# Patient Record
Sex: FEMALE | Race: WHITE | NOT HISPANIC OR LATINO | URBAN - METROPOLITAN AREA
[De-identification: names, ages, dates, MRNs, and addresses within clinical notes are randomized per-mention and may not be internally consistent; named-entity substitution may affect disease eponyms.]

---

## 2018-03-02 ENCOUNTER — OFFICE VISIT (OUTPATIENT)
Dept: URGENT CARE | Facility: CLINIC | Age: 43
End: 2018-03-02
Payer: COMMERCIAL

## 2018-03-02 VITALS
RESPIRATION RATE: 16 BRPM | BODY MASS INDEX: 20.92 KG/M2 | OXYGEN SATURATION: 99 % | SYSTOLIC BLOOD PRESSURE: 125 MMHG | DIASTOLIC BLOOD PRESSURE: 77 MMHG | HEIGHT: 68 IN | HEART RATE: 59 BPM | TEMPERATURE: 98 F | WEIGHT: 138 LBS

## 2018-03-02 DIAGNOSIS — J02.9 SORE THROAT: Primary | ICD-10-CM

## 2018-03-02 DIAGNOSIS — J06.9 UPPER RESPIRATORY TRACT INFECTION, UNSPECIFIED TYPE: ICD-10-CM

## 2018-03-02 LAB
CTP QC/QA: YES
S PYO RRNA THROAT QL PROBE: NEGATIVE

## 2018-03-02 PROCEDURE — 99203 OFFICE O/P NEW LOW 30 MIN: CPT | Mod: 25,S$GLB,, | Performed by: FAMILY MEDICINE

## 2018-03-02 PROCEDURE — 87070 CULTURE OTHR SPECIMN AEROBIC: CPT

## 2018-03-02 PROCEDURE — 87880 STREP A ASSAY W/OPTIC: CPT | Mod: QW,S$GLB,, | Performed by: FAMILY MEDICINE

## 2018-03-02 PROCEDURE — 96372 THER/PROPH/DIAG INJ SC/IM: CPT | Mod: S$GLB,,, | Performed by: FAMILY MEDICINE

## 2018-03-02 RX ORDER — BETAMETHASONE SODIUM PHOSPHATE AND BETAMETHASONE ACETATE 3; 3 MG/ML; MG/ML
6 INJECTION, SUSPENSION INTRA-ARTICULAR; INTRALESIONAL; INTRAMUSCULAR; SOFT TISSUE
Status: COMPLETED | OUTPATIENT
Start: 2018-03-02 | End: 2018-03-02

## 2018-03-02 RX ORDER — CALCIUM ACETATE 667 MG/1
667 CAPSULE ORAL
COMMUNITY

## 2018-03-02 RX ORDER — AMOXICILLIN 875 MG/1
875 TABLET, FILM COATED ORAL 2 TIMES DAILY
Qty: 20 TABLET | Refills: 0 | Status: SHIPPED | OUTPATIENT
Start: 2018-03-02 | End: 2018-03-12

## 2018-03-02 RX ORDER — MULTIVITAMIN
1 TABLET ORAL DAILY
COMMUNITY

## 2018-03-02 RX ADMIN — BETAMETHASONE SODIUM PHOSPHATE AND BETAMETHASONE ACETATE 6 MG: 3; 3 INJECTION, SUSPENSION INTRA-ARTICULAR; INTRALESIONAL; INTRAMUSCULAR; SOFT TISSUE at 09:03

## 2018-03-02 NOTE — PATIENT INSTRUCTIONS
Pharyngitis   If your condition worsens or fails to improve we recommend that you receive another evaluation at the ER immediately or contact your PCP to discuss your concerns or return here. You must understand that you've received an urgent care treatment only and that you may be released before all your medical problems are known or treated. You the patient will arrange for followup care as instructed.   The majority of all sore throats or tonsillitis are viral and antibiotics will not treat this.     If the strep test performed in office was Positive:  - Complete the full course of antibiotics given  - Drink plenty of cool liquids while avoid any beverage or food that can irritate your            throat (acidic, spicy or salty foods).  - Throw away your toothbrush now and when you complete your antibiotics.    If the strep culture was done and returns negative in 3-5 days and you are still having a sore throat, you may need to get a mono spot test done or repeated.   Tylenol or ibuprofen for pain may help as long as you are not allergic to these meds or have a medical condition such as stomach ulcers, liver or kidney disease or taking blood thinners etc that would   prevent you from using these medications.   Rest and fluids will help as well.   If you were prescribed antibiotics and are female and on BCP use additional methods to prevent pregnancy while on the antibiotics and for one cycle after.                                                                URI   If your condition worsens or fails to improve we recommend that you receive another evaluation at the ER immediately or contact your PCP to discuss your concerns or return here. You must understand that you've received an urgent care treatment only and that you may be released before all your medical problems are known or treated. You the patient will arrange for follouwp care as instructed.   If  "we discussed that I think your illness is viral, it will not respond to antibiotics and will last 5-7 days. If we discussed "wait and see" antibiotics and if over the next few days the symptoms worsen start the antibiotics I have given you.   -  If you are female and on BCP and do take the antibiotics, use additional methods to prevent pregnancy while on the antibiotics and for one cycle after.   -  Flonase (fluticasone) is a nasal spray which is available over the counter and may help with your symptoms.   -  Zyrtec D, Claritin D or Allegra D can also help with symptoms of congestion and drainage.   -  If you have hypertension avoid using the "D" which is the decongestant.  Instead you can use Coricidin HBP for cold and cough symptoms.    -  If you just have drainage you can take plain Zyrtec, Claritin or Allegra   -  If you just have a congested feeling you can take pseudoephedrine (unless you have high blood pressure) which you have to sign for behind the counter. Do not buy the phenylephrine which is on the shelf as it is not effective   -  Rest and fluids are also important.   -  Tylenol or ibuprofen can also be used as directed for pain unless you have an allergy to them or medical condition such as stomach ulcers, kidney or liver disease or blood thinners etc for which you should not be taking these type of medications.   -  If you are flying in the next few days Afrin nose drops for the airplane flight upon take off and landing may help. Other than at those times refrain from using afrin.   - If you were prescribed a narcotic do not drive or operate heavy machinery while taking these medications.     You have received a WASP (Wait and See Prescription) of antibiotics.  Usually this is in case your symptoms worsen (worsening fever, sinus pain, sore throat or ear pain).  The overall goal is to give our symptomatic treatment at least 48 hrs to improve your symptoms.  If they are not improving, I would like you " to call us and we can decide to start the antibiotics.   We are working extremely hard to limit antibiotic use to prevent resistance.      Steroid Injection  You received a steroid shot today - As discussed, this can elevate your blood pressure, elevate your blood sugar, water weight gain, nervous energy, redness to the face and dimpling of the skin where the shot goes in.         Pharyngitis (Sore Throat), Report Pending    Pharyngitis (sore throat) is often due to a virus. It can also be caused by the streptococcus, or strep, bacterium, often called strep throat. Both viral and strep infections can cause throat pain that is worse when swallowing, aching all over with headache, and fever. Both types of infections are contagious. They may be spread by coughing, kissing, or touching others after touching your mouth or nose.  A test has been done to find out whether you (or your child, if your child is the patient) have strep throat. Call this facility or your healthcare provider if you were not given your test results. If the test is positive for strep infection, you will need to take antibiotic medicines. A prescription can be called into your pharmacy at that time. If the test is negative, you probably have a viral pharyngitis. This does not need to be treated with antibiotics. Until you receive the results of the strep test, you should stay home from work. If your child is being tested, he or she should stay home from school.  Home care  · Rest at home. Drink plenty of fluids so you won't get dehydrated.  · If the test is positive for strep, don't go to work or school for the first 2 days of taking the antibiotics. After this time, you will not be contagious. You can then return to work or school if you are feeling better.   · Take the antibiotic medicine for the full 10 days, even if you feel better. This is very important to make sure the infection is treated. It is also important to prevent drug-resistant germs  from developing. If you were given an antibiotic shot, you won't need more antibiotics.  · For children: Use acetaminophen for fever, fussiness, or discomfort. In infants older than 6 months of age, you may use ibuprofen instead of acetaminophen. Talk with your child's healthcare provider before giving these medicines if your child has chronic liver or kidney disease or ever had a stomach ulcer or GI bleeding. Never give aspirin to a child under 18 years of age who is ill with a fever. It may cause severe liver damage.  · For adults: Use acetaminophen or ibuprofen to control pain or fever, unless another medicine was prescribed for this. Talk with your healthcare provider before taking these medicines if you have chronic liver or kidney disease or ever had a stomach ulcer or GI bleeding.  · Use throat lozenges or numbing throat sprays to help reduce pain. Gargling with warm salt water will also help reduce throat pain. For this, dissolve 1/2 teaspoon of salt in 1 glass of warm water. To help soothe a sore throat, children can sip on juice or a popsicle. Children 5 years and older can also suck on a lollipop or hard candy.  · Don't eat salty or spicy foods. These can irritate the throat.  Follow-up care  Follow up with your healthcare provider or our staff if you don't get better over the next week.  When to seek medical advice  Call your healthcare provider right away if any of these occur:  · Fever as directed by your healthcare provider. For children, seek care if:  ¨ Your child is of any age and has repeated fevers above 104°F (40°C).  ¨ Your child is younger than 2 years of age and has a fever of 100.4°F (38°C) that continues for more than 1 day.  ¨ Your child is 2 years old or older and has a fever of 100.4°F (38°C) that continues for more than 3 days.  · New or worsening ear pain, sinus pain, or headache  · Painful lumps in the back of neck  · Stiff neck  · Lymph nodes are getting larger  · Inability to  swallow liquids, excessive drooling, or inability to open mouth wide due to throat pain  · Signs of dehydration (very dark urine or no urine, sunken eyes, dizziness)  · Trouble breathing or noisy breathing  · Muffled voice  · New rash  · Child appears to be getting sicker  Date Last Reviewed: 4/13/2015  © 1993-3875 [employer]. [address] [place] [address]. All rights reserved. This information is not intended as a substitute for professional medical care. Always follow your healthcare professional's instructions.        Viral Upper Respiratory Illness (Adult)  You have a viral upper respiratory illness (URI), which is another term for the common cold. This illness is contagious during the first few days. It is spread through the air by coughing and sneezing. It may also be spread by direct contact (touching the sick person and then touching your own eyes, nose, or mouth). Frequent handwashing will decrease risk of spread. Most viral illnesses go away within 7 to 10 days with rest and simple home remedies. Sometimes the illness may last for several weeks. Antibiotics will not kill a virus, and they are generally not prescribed for this condition.    Home care  · If symptoms are severe, rest at home for the first 2 to 3 days. When you resume activity, don't let yourself get too tired.  · Avoid being exposed to cigarette smoke (yours or others).  · You may use acetaminophen or ibuprofen to control pain and fever, unless another medicine was prescribed. (Note: If you have chronic liver or kidney disease, have ever had a stomach ulcer or gastrointestinal bleeding, or are taking blood-thinning medicines, talk with your healthcare provider before using these medicines.) Aspirin should never be given to anyone under 18 years of age who is ill with a viral infection or fever. It may cause severe liver or brain damage.  · Your appetite may be poor, so a light diet is fine. Avoid dehydration by  drinking 6 to 8 glasses of fluids per day (water, soft drinks, juices, tea, or soup). Extra fluids will help loosen secretions in the nose and lungs.  · Over-the-counter cold medicines will not shorten the length of time youre sick, but they may be helpful for the following symptoms: cough, sore throat, and nasal and sinus congestion. (Note: Do not use decongestants if you have high blood pressure.)  Follow-up care  Follow up with your healthcare provider, or as advised.  When to seek medical advice  Call your healthcare provider right away if any of these occur:  · Cough with lots of colored sputum (mucus)  · Severe headache; face, neck, or ear pain  · Difficulty swallowing due to throat pain  · Fever of 100.4°F (38°C)  Call 911, or get immediate medical care  Call emergency services right away if any of these occur:  · Chest pain, shortness of breath, wheezing, or difficulty breathing  · Coughing up blood  · Inability to swallow due to throat pain  Date Last Reviewed: 9/13/2015  © 4554-1794 [employer]. [address] [place] [address], [place] [address]. All rights reserved. This information is not intended as a substitute for professional medical care. Always follow your healthcare professional's instructions.         118

## 2018-03-02 NOTE — PROGRESS NOTES
"Subjective:       Patient ID: Tram Rodrigues is a 43 y.o. female.    Vitals:  height is 5' 8" (1.727 m) and weight is 62.6 kg (138 lb). Her temperature is 97.7 °F (36.5 °C). Her blood pressure is 125/77 and her pulse is 59 (abnormal). Her respiration is 16 and oxygen saturation is 99%.     Chief Complaint: Sore Throat    Pt in for sore throat. Pt just getting over her 3 kids being sick. Her kids had strep. Pt having some sinus pressure. Symptoms first started Wednesday. Pt has been taking motrin for symptoms. Has hx of strep. Pt here to run a marathon on Sunday. From [place]. Pt last dose of motrin was around 7-8am this morning. Pt having some post nasal drip. Pt has hx of sinus infections.       Sore Throat    This is a new problem. The current episode started in the past 7 days. The problem has been gradually worsening. The pain is at a severity of 4/10. Associated symptoms include headaches, swollen glands and trouble swallowing. Pertinent negatives include no abdominal pain, congestion, coughing, diarrhea, ear pain, hoarse voice, shortness of breath or vomiting. She has had exposure to strep.     Review of Systems   Constitution: Positive for weakness and malaise/fatigue. Negative for chills, decreased appetite and fever.   HENT: Positive for sore throat and trouble swallowing. Negative for congestion, ear pain and hoarse voice.    Eyes: Negative for discharge and redness.   Cardiovascular: Negative for chest pain, dyspnea on exertion and leg swelling.   Respiratory: Negative for cough, shortness of breath, sputum production and wheezing.    Musculoskeletal: Negative for myalgias.   Gastrointestinal: Negative for abdominal pain, constipation, diarrhea, nausea and vomiting.   Neurological: Positive for dizziness and headaches.       Objective:      Physical Exam   Constitutional: She is oriented to person, place, and time. Vital signs are normal. She appears well-developed and well-nourished. She is " cooperative.  Non-toxic appearance. She does not appear ill. No distress.   HENT:   Head: Normocephalic and atraumatic.   Right Ear: Hearing, tympanic membrane, external ear and ear canal normal.   Left Ear: Hearing, tympanic membrane, external ear and ear canal normal.   Nose: Rhinorrhea present. No mucosal edema or nasal deformity. No epistaxis. Right sinus exhibits no maxillary sinus tenderness and no frontal sinus tenderness. Left sinus exhibits no maxillary sinus tenderness and no frontal sinus tenderness.   Mouth/Throat: Uvula is midline and mucous membranes are normal. No trismus in the jaw. Normal dentition. No uvula swelling. Posterior oropharyngeal erythema (post nasal drainage noted) present. Tonsils are 0 on the right. Tonsils are 0 on the left.   Eyes: Conjunctivae, EOM and lids are normal. Pupils are equal, round, and reactive to light. No scleral icterus.   Sclera clear bilat   Neck: Trachea normal, normal range of motion, full passive range of motion without pain and phonation normal. Neck supple.   Cardiovascular: Normal rate, regular rhythm, normal heart sounds, intact distal pulses and normal pulses.    Pulmonary/Chest: Effort normal and breath sounds normal. No respiratory distress.   Abdominal: Soft. Normal appearance and bowel sounds are normal. She exhibits no distension. There is no tenderness.   Musculoskeletal: Normal range of motion. She exhibits no edema or deformity.   Neurological: She is alert and oriented to person, place, and time. She exhibits normal muscle tone. Coordination normal.   Skin: Skin is warm, dry and intact. She is not diaphoretic. No pallor.   Psychiatric: She has a normal mood and affect. Her speech is normal and behavior is normal. Judgment and thought content normal. Cognition and memory are normal.   Nursing note and vitals reviewed.      Office Visit on 03/02/2018   Component Date Value Ref Range Status    Rapid Strep A Screen 03/02/2018 Negative  Negative Final      Acceptable 03/02/2018 Yes   Final     Assessment:       1. Sore throat    2. Upper respiratory tract infection, unspecified type        Plan:         Sore throat  -     POCT rapid strep A  -     Throat culture    Upper respiratory tract infection, unspecified type  -     betamethasone acetate-betamethasone sodium phosphate injection 6 mg; Inject 1 mL (6 mg total) into the muscle one time.  -     amoxicillin (AMOXIL) 875 MG tablet; Take 1 tablet (875 mg total) by mouth 2 (two) times daily.  Dispense: 20 tablet; Refill: 0      Patient Instructions                                                         Pharyngitis   If your condition worsens or fails to improve we recommend that you receive another evaluation at the ER immediately or contact your PCP to discuss your concerns or return here. You must understand that you've received an urgent care treatment only and that you may be released before all your medical problems are known or treated. You the patient will arrange for followup care as instructed.   The majority of all sore throats or tonsillitis are viral and antibiotics will not treat this.     If the strep test performed in office was Positive:  - Complete the full course of antibiotics given  - Drink plenty of cool liquids while avoid any beverage or food that can irritate your            throat (acidic, spicy or salty foods).  - Throw away your toothbrush now and when you complete your antibiotics.    If the strep culture was done and returns negative in 3-5 days and you are still having a sore throat, you may need to get a mono spot test done or repeated.   Tylenol or ibuprofen for pain may help as long as you are not allergic to these meds or have a medical condition such as stomach ulcers, liver or kidney disease or taking blood thinners etc that would   prevent you from using these medications.   Rest and fluids will help as well.   If you were prescribed antibiotics and are female  "and on BCP use additional methods to prevent pregnancy while on the antibiotics and for one cycle after.                                                                URI   If your condition worsens or fails to improve we recommend that you receive another evaluation at the ER immediately or contact your PCP to discuss your concerns or return here. You must understand that you've received an urgent care treatment only and that you may be released before all your medical problems are known or treated. You the patient will arrange for follouw care as instructed.   If we discussed that I think your illness is viral, it will not respond to antibiotics and will last 5-7 days. If we discussed "wait and see" antibiotics and if over the next few days the symptoms worsen start the antibiotics I have given you.   -  If you are female and on BCP and do take the antibiotics, use additional methods to prevent pregnancy while on the antibiotics and for one cycle after.   -  Flonase (fluticasone) is a nasal spray which is available over the counter and may help with your symptoms.   -  Zyrtec D, Claritin D or Allegra D can also help with symptoms of congestion and drainage.   -  If you have hypertension avoid using the "D" which is the decongestant.  Instead you can use Coricidin HBP for cold and cough symptoms.    -  If you just have drainage you can take plain Zyrtec, Claritin or Allegra   -  If you just have a congested feeling you can take pseudoephedrine (unless you have high blood pressure) which you have to sign for behind the counter. Do not buy the phenylephrine which is on the shelf as it is not effective   -  Rest and fluids are also important.   -  Tylenol or ibuprofen can also be used as directed for pain unless you have an allergy to them or medical condition such as stomach ulcers, kidney or liver disease or blood thinners etc for which you should not be taking these type of medications.   -  If you are flying in " the next few days Afrin nose drops for the airplane flight upon take off and landing may help. Other than at those times refrain from using afrin.   - If you were prescribed a narcotic do not drive or operate heavy machinery while taking these medications.     You have received a WASP (Wait and See Prescription) of antibiotics.  Usually this is in case your symptoms worsen (worsening fever, sinus pain, sore throat or ear pain).  The overall goal is to give our symptomatic treatment at least 48 hrs to improve your symptoms.  If they are not improving, I would like you to call us and we can decide to start the antibiotics.   We are working extremely hard to limit antibiotic use to prevent resistance.      Steroid Injection  You received a steroid shot today - As discussed, this can elevate your blood pressure, elevate your blood sugar, water weight gain, nervous energy, redness to the face and dimpling of the skin where the shot goes in.

## 2018-03-05 ENCOUNTER — TELEPHONE (OUTPATIENT)
Dept: URGENT CARE | Facility: CLINIC | Age: 43
End: 2018-03-05

## 2018-03-05 LAB — BACTERIA THROAT CULT: NORMAL

## 2018-03-05 NOTE — TELEPHONE ENCOUNTER
----- Message from Naomi Gore NP sent at 3/5/2018  4:06 PM CST -----  Please call patient back to inform of normal results.  Thank you.

## 2022-03-28 ENCOUNTER — OFFICE VISIT (OUTPATIENT)
Dept: ORTHOPEDIC SURGERY | Age: 47
End: 2022-03-28
Payer: COMMERCIAL

## 2022-03-28 VITALS — WEIGHT: 140 LBS | BODY MASS INDEX: 21.22 KG/M2 | HEIGHT: 68 IN

## 2022-03-28 DIAGNOSIS — M25.551 RIGHT HIP PAIN: Primary | ICD-10-CM

## 2022-03-28 PROCEDURE — 99204 OFFICE O/P NEW MOD 45 MIN: CPT | Performed by: ORTHOPAEDIC SURGERY

## 2022-03-28 RX ORDER — GABAPENTIN 600 MG/1
600 TABLET ORAL
COMMUNITY
Start: 2022-01-16

## 2022-03-28 RX ORDER — DICLOFENAC SODIUM 50 MG/1
50 TABLET, DELAYED RELEASE ORAL 2 TIMES DAILY WITH MEALS
Qty: 60 TABLET | Refills: 0 | Status: SHIPPED | OUTPATIENT
Start: 2022-03-28 | End: 2022-09-28 | Stop reason: ALTCHOICE

## 2022-03-28 RX ORDER — BISMUTH SUBSALICYLATE 262 MG
1 TABLET,CHEWABLE ORAL DAILY
COMMUNITY

## 2022-03-28 NOTE — PROGRESS NOTES
Isadore Homans (: 1975) is a 49 y.o. female patient, here for evaluation of the following chief complaint(s):  Knee Pain (right hip pain)       ASSESSMENT/PLAN:  Below is the assessment and plan developed based on review of pertinent history, physical exam, labs, studies, and medications. 50-year-old female comes in today complaining of right-sided hip pain. This is going on for 3 weeks. 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 Pain is in her groin and thigh. Exam is consistent for possible labral tearing. Will monitor with conservative management. Admitting her to physical therapy. Also gave her diclofenac. Risks and benefits of diclofenac were discussed with her. If she is not better in the next 4 to 6 weeks will consider getting MRI. 1. Right hip pain  -     XR HIP RT W OR WO PELV 2-3 VWS; Future  -     REFERRAL TO PHYSICAL THERAPY      Encounter Diagnosis   Name Primary?  Right hip pain Yes        No follow-ups on file. SUBJECTIVE/OBJECTIVE:  Isadore Homans (: 1975) is a 49 y.o. female who presents today for the following:  Chief Complaint   Patient presents with    Knee Pain     right hip pain       50-year-old female comes in today complaining of right-sided hip pain. This is going on for 3 weeks. 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 Pain is in her groin and thigh. She notices a mild limp occasionally. She does not use a walking aid. She has tried Motrin but this has not helped. IMAGING:  XR Results (most recent):  Results from Appointment encounter on 22    XR HIP RT W OR WO PELV 2-3 VWS    Narrative  AP lateral x-rays ordered and inability view the right hip. Joint spaces well-preserved. No significant dysplasia or impingement. No fractures       No Known Allergies    Current Outpatient Medications   Medication Sig    gabapentin (NEURONTIN) 600 mg tablet Take 600 mg by mouth nightly.     multivitamin (ONE A DAY) tablet Take 1 Tablet by mouth daily.  Lactobac no.41/Bifidobact no.7 (PROBIOTIC-10 PO) Take  by mouth.  diclofenac EC (VOLTAREN) 50 mg EC tablet Take 1 Tablet by mouth two (2) times daily (with meals). No current facility-administered medications for this visit. History reviewed. No pertinent past medical history. Past Surgical History:   Procedure Laterality Date    HX TUBAL LIGATION      ME VAG HYST,RMV TUBE/OVARY         Family History   Problem Relation Age of Onset    Heart Disease Father         Social History     Tobacco Use    Smoking status: Never Smoker    Smokeless tobacco: Never Used   Substance Use Topics    Alcohol use: Yes        All systems reviewed x 12 and were negative with the exception of None      No flowsheet data found. Vitals:  Ht 5' 8\" (1.727 m)   Wt 140 lb (63.5 kg)   BMI 21.29 kg/m²    Body mass index is 21.29 kg/m². Physical Exam    General: NAD, well developed, well nourished. Cardiac: Extremities well perfused. Respiratory: Nonlabored breathing. RLE: No antalgic gait. Discomfort with stinchfield. 0-100 degrees of flexion. >20 degrees internal rotation, >30 degrees external rotation. Negative KAYLAN. Moderate pain with flexion adduction and internal rotation. .  Motor strength grossly intact. LLE: No antalgic gait. Negative stinchfield. 0-100 degrees of flexion. >20 degrees internal rotation, >30 degrees external rotation. Negative KAYLAN. No pain with flexion adduction and internal rotation. .  Motor strength grossly intact. Skin: Warm well perfused. Vascular: Palpable pedal pulses bilaterally. Equal. Capillary refill less than 2 seconds. An electronic signature was used to authenticate this note.   -- Jane Lazo MD

## 2022-05-19 ENCOUNTER — OFFICE VISIT (OUTPATIENT)
Dept: ORTHOPEDIC SURGERY | Age: 47
End: 2022-05-19
Payer: COMMERCIAL

## 2022-05-19 VITALS — HEIGHT: 68 IN | BODY MASS INDEX: 21.22 KG/M2 | WEIGHT: 140 LBS

## 2022-05-19 DIAGNOSIS — M25.551 RIGHT HIP PAIN: Primary | ICD-10-CM

## 2022-05-19 DIAGNOSIS — M70.61 TROCHANTERIC BURSITIS OF RIGHT HIP: ICD-10-CM

## 2022-05-19 PROCEDURE — 20605 DRAIN/INJ JOINT/BURSA W/O US: CPT | Performed by: PHYSICIAN ASSISTANT

## 2022-05-19 PROCEDURE — 99213 OFFICE O/P EST LOW 20 MIN: CPT | Performed by: PHYSICIAN ASSISTANT

## 2022-05-19 RX ORDER — TRIAMCINOLONE ACETONIDE 40 MG/ML
40 INJECTION, SUSPENSION INTRA-ARTICULAR; INTRAMUSCULAR ONCE
Status: DISCONTINUED | OUTPATIENT
Start: 2022-05-19 | End: 2022-05-20

## 2022-05-20 RX ORDER — TRIAMCINOLONE ACETONIDE 40 MG/ML
40 INJECTION, SUSPENSION INTRA-ARTICULAR; INTRAMUSCULAR ONCE
Status: SHIPPED | OUTPATIENT
Start: 2022-05-20 | End: 2022-05-20

## 2022-05-20 NOTE — PROGRESS NOTES
Yesenia Webber (: 1975) is a 49 y.o. female patient, here for evaluation of the following chief complaint(s):  Hip Pain (right hip pain)       ASSESSMENT/PLAN:  Below is the assessment and plan developed based on review of pertinent history, physical exam, labs, studies, and medications. 53-year-old female comes in today complaining of right-sided hip pain. Has been going on for 10+ weeks. Did a course of physical therapy as well as prescription medication with no relief in symptoms. Pain is still present. In her anterior hip. Unable to do any sort of cardio physical activity. She also has some point tenderness over the lateral hip today. Discussed with patient that she could have issues going on with her labrum, will plan to move forward with an MRI given failure of conservative treatment and duration of symptoms. We will also plan to do a steroid injection of the lateral hip she has a mixed picture with trochanteric bursitis. After verbal consent was obtained I injected 40 mg triamcinolone into the right trochanteric bursa using sterile technique. Patient tolerated well. Follow-up once MRI completed or sooner as needed. 1. Right hip pain  -     DRAIN/INJECT INTERMEDIATE JOINT/BURSA  -     MRI HIP  RT  WO CONT; Future  2. Trochanteric bursitis of right hip  -     triamcinolone acetonide (KENALOG-40) 40 mg/mL injection 40 mg; 40 mg, Intra artICUlar, ONCE, 1 dose, On 22 at 1200      Encounter Diagnoses   Name Primary?  Right hip pain Yes    Trochanteric bursitis of right hip         No follow-ups on file. SUBJECTIVE/OBJECTIVE:  Yesenia Webber (: 1975) is a 49 y.o. female who presents today for the following:  Chief Complaint   Patient presents with    Hip Pain     right hip pain       53-year-old female comes in today for follow-up. She has been having right-sided hip pain in her anterior groin for 10+ weeks.   She is a runner and states she currently cannot run. She has tried conservative management with physical therapy, prescription medication, activity modification, rest and ice with no improvement in symptoms. Concern for possible labral tearing. She also has some point tenderness over her lateral hip today. No radiation of symptoms. IMAGING:  XR Results (most recent):  Results from Appointment encounter on 03/28/22    XR HIP RT W OR WO PELV 2-3 VWS    Narrative  AP lateral x-rays ordered and inability view the right hip. Joint spaces well-preserved. No significant dysplasia or impingement. No fractures       No Known Allergies    Current Outpatient Medications   Medication Sig    gabapentin (NEURONTIN) 600 mg tablet Take 600 mg by mouth nightly.  multivitamin (ONE A DAY) tablet Take 1 Tablet by mouth daily.  Lactobac no.41/Bifidobact no.7 (PROBIOTIC-10 PO) Take  by mouth.  diclofenac EC (VOLTAREN) 50 mg EC tablet Take 1 Tablet by mouth two (2) times daily (with meals). Current Facility-Administered Medications   Medication    triamcinolone acetonide (KENALOG-40) 40 mg/mL injection 40 mg       No past medical history on file. Past Surgical History:   Procedure Laterality Date    HX TUBAL LIGATION      [place] VAG HYST,RMV TUBE/OVARY         Family History   Problem Relation Age of Onset    Heart Disease Father         Social History     Tobacco Use    Smoking status: Never Smoker    Smokeless tobacco: Never Used   Substance Use Topics    Alcohol use: Yes        All systems reviewed x 12 and were negative with the exception of None      No flowsheet data found. Vitals:  Ht 5' 8\" (1.727 m)   Wt 140 lb (63.5 kg)   BMI 21.29 kg/m²    Body mass index is 21.29 kg/m². Physical Exam    General: NAD, well developed, well nourished.     Cardiac: Extremities well perfused.     Respiratory: Nonlabored breathing.      RLE: Mild antalgic gait. Discomfort with stinchfield. 0-100 degrees of flexion.  >20 degrees internal rotation, >30 degrees external rotation. Negative KAYLAN. Moderate pain with flexion adduction and internal rotation. Mild point tenderness over lateral hip. Motor strength grossly intact.     LLE: No antalgic gait. Negative stinchfield. 0-100 degrees of flexion. >20 degrees internal rotation, >30 degrees external rotation. Negative KAYLAN. No pain with flexion adduction and internal rotation. .  Motor strength grossly intact.     Skin: Warm well perfused.       Vascular: Palpable pedal pulses bilaterally. Equal. Capillary refill less than 2 seconds    Randee Hancock M.D. was available for immediate consultation as the supervising physician. An electronic signature was used to authenticate this note.   -- Stephany Salamanca PA-C

## 2022-05-25 ENCOUNTER — HOSPITAL ENCOUNTER (OUTPATIENT)
Dept: MRI IMAGING | Age: 47
Discharge: HOME | End: 2022-05-25
Attending: PHYSICIAN ASSISTANT
Payer: COMMERCIAL

## 2022-05-25 DIAGNOSIS — M25.551 RIGHT HIP PAIN: ICD-10-CM

## 2022-05-25 PROCEDURE — 73721 MRI JNT OF LWR EXTRE W/O DYE: CPT

## 2022-06-16 DIAGNOSIS — M25.551 RIGHT HIP PAIN: Primary | ICD-10-CM

## 2022-06-16 RX ORDER — METHYLPREDNISOLONE 4 MG/1
4 TABLET ORAL
Qty: 1 DOSE PACK | Refills: 0 | Status: SHIPPED | OUTPATIENT
Start: 2022-06-16 | End: 2022-09-28 | Stop reason: ALTCHOICE

## 2022-06-16 NOTE — PROGRESS NOTES
Called and reviewed MRI findings with patient. Will plan to try a steroid pack and see how she does. If no improvement would follow up without spine team given spondylosis of the lumbar spine.

## 2022-07-22 ENCOUNTER — OFFICE VISIT (OUTPATIENT)
Dept: ORTHOPEDIC SURGERY | Age: 47
End: 2022-07-22
Payer: COMMERCIAL

## 2022-07-22 VITALS — HEIGHT: 68 IN | BODY MASS INDEX: 21.22 KG/M2 | WEIGHT: 140 LBS

## 2022-07-22 DIAGNOSIS — M51.36 BULGE OF LUMBAR DISC WITHOUT MYELOPATHY: ICD-10-CM

## 2022-07-22 DIAGNOSIS — M51.36 DDD (DEGENERATIVE DISC DISEASE), LUMBAR: ICD-10-CM

## 2022-07-22 DIAGNOSIS — M54.50 LUMBAR BACK PAIN: Primary | ICD-10-CM

## 2022-07-22 PROCEDURE — 99214 OFFICE O/P EST MOD 30 MIN: CPT | Performed by: PHYSICIAN ASSISTANT

## 2022-07-22 NOTE — PROGRESS NOTES
Hans Short (: 1975) is a 49 y.o. female, established  patient, here for evaluation of the following chief complaint(s):  Back Pain         SUBJECTIVE/OBJECTIVE:    Hans Short (: 1975) is a 49 y.o. female who presents for evaluation of lumbar back pain. Symptoms have been present for the past 5-6 months. The patient denies any history of trauma or changes in activities which may have precipitated his current symptoms. The patient localizes pain in the bilateral lower lumbar region with radiation to the right lateral and anterior hip. The patient denies groin discomfort. The patient denies radiation of symptoms to the lower extremities. The patient was previously evaluated by Dr. Sarika Rosales and JOE Roa and has had oral steroids, NSAIDs, pain medication, local cortisone injection and has recently completed several weeks of outpatient physical therapy without lasting benefit. The patient is currently taking Advil on an as-needed basis and gabapentin 600 mg at night and rates her average daily pain level as a 6/10. The patient works out at [employer] regularly, swims and spins, which does increase her pain. Walking and going up and down stairs also increases her pain level. The patient denies lower extremity numbness, tingling, pain or weakness. Patient denies saddle paraesthesias/ anaesthesia. No flowsheet data found. ROS    The patient denies fevers, chills, chest pain, shortness of breath, nausea, vomiting. Positive for musculoskeletal issues as described in the HPI. Vitals:  Ht 5' 8\" (1.727 m)   Wt 140 lb (63.5 kg)   BMI 21.29 kg/m²    Body mass index is 21.29 kg/m². PHYSICAL EXAM:    The patient is alert and oriented x3 and in no acute distress. The patient is fit in appearance and appears younger than her stated age. Patient ambulates with a normal gait without gait aids.  Sensory testing in all major nerve distributions in the lower extremities is intact and symmetric. Manual motor testing of the major muscle groups of the lower extremities including dorsiflexion/EHL/ plantarflexion, knee flexion/extension, hip flexion/extension/abduction/ adduction is intact and symmetric in the bilateral lower extremities. Deep tendon reflexes +2/4 and symmetric in the bilateral knees and ankles. Hip range of motion is pain-free bilaterally. Negative straight leg raise bilaterally. No evidence of clonus bilaterally. Babinski's downgoing and symmetric bilaterally. Distal pulses intact and symmetric bilaterally. There is no tenderness to palpation of the thoracic, lumbar or sacral regions. There is tenderness to palpation of the right lateral hip just proximal to the trochanteric bursa. IMAGING:    XR Results (most recent):  Results from Appointment encounter on 03/28/22    XR HIP RT W OR WO PELV 2-3 VWS    Narrative  AP lateral x-rays ordered and inability view the right hip. Joint spaces well-preserved. No significant dysplasia or impingement. No fractures       MRI Results (most recent):  Results from [place] encounter on 05/25/22    MRI HIP  RT  WO CONT    Narrative  EXAM: MRI HIP  RT  WO CONT    INDICATION: Right hip pain    COMPARISON: Radiographs 3/28/2022    TECHNIQUE: Coronal T1 and axial T2 fat-saturated MRI of the whole pelvis; axial  and sagittal T2 fat-saturated; coronal proton density fat-saturated MRI of the  right hip . CONTRAST: None    FINDINGS: Bone marrow: No acute fracture, dislocation, or marrow replacing  process. Joint fluid: No significant joint effusion. Articular cartilage: Intact. Acetabular labrum: Intact. Hip morphology: Normal concavity of the femoral head-neck junction. No  acetabular overcoverage or retroversion. Tendons: Mild edema surrounds the distal right gluteus minimus tendon. Muscles: Within normal limits. Soft tissue mass: None.     Intrapelvic soft tissues: no acute process. Significant spondylosis is seen in the lower lumbar spine. Impression  1. Mild right gluteus minimus tendinitis. 2. Significant spondylosis in the lower lumbar spine. No Known Allergies      Current Outpatient Medications   Medication Sig    methylPREDNISolone (MEDROL DOSEPACK) 4 mg tablet Take 1 Tablet by mouth Specific Days and Specific Times. Per dose pack instructions    gabapentin (NEURONTIN) 600 mg tablet Take 600 mg by mouth nightly. multivitamin (ONE A DAY) tablet Take 1 Tablet by mouth daily. Lactobac no.41/Bifidobact no.7 (PROBIOTIC-10 PO) Take  by mouth. diclofenac EC (VOLTAREN) 50 mg EC tablet Take 1 Tablet by mouth two (2) times daily (with meals). No current facility-administered medications for this visit. History reviewed. No pertinent past medical history. Past Surgical History:   Procedure Laterality Date    HX TUBAL LIGATION      [place] VAG HYST,RMV TUBE/OVARY           Family History   Problem Relation Age of Onset    Heart Disease Father           Social History     Tobacco Use    Smoking status: Never    Smokeless tobacco: Never   Substance Use Topics    Alcohol use: Yes                 ASSESSMENT/PLAN:      1. Lumbar back pain  -     XR SPINE LUMB MIN 4 V; Future      Below is the assessment and plan developed based on review of pertinent history, physical exam, labs, studies, and medications. Have discussed the patients diagnosis and radiographic findings at length and have answered all patient questions to her questions to her satisfaction. Have advised continued use of OTC NSAIDs and/or Tylenol on an as-needed basis. Given the patient's ongoing pain despite conservative management have referred the patient for MRI lumbar to assess for severe stenosis vs disc herniation. Will plan on seeing the patient back for reevaluation and further treatment recommendations once imaging results are available for review.  The patient understands and agrees to the treatment plan as outlined above. Will plan on seeing the patient back for reevaluation in 4 to 6 weeks time should symptoms persist or worsen. The patient understands and agrees to the treatment plan as outlined above. Return if symptoms worsen or fail to improve. Dr. Franco Cortes was available for immediate consult during this encounter. An electronic signature was used to authenticate this note.     -- Ciara Mark PA-C

## 2022-07-27 ENCOUNTER — TELEPHONE (OUTPATIENT)
Dept: ORTHOPEDIC SURGERY | Age: 47
End: 2022-07-27

## 2022-07-27 DIAGNOSIS — M51.36 DDD (DEGENERATIVE DISC DISEASE), LUMBAR: ICD-10-CM

## 2022-07-27 DIAGNOSIS — M54.50 LUMBAR BACK PAIN: Primary | ICD-10-CM

## 2022-07-27 DIAGNOSIS — M51.36 BULGE OF LUMBAR DISC WITHOUT MYELOPATHY: ICD-10-CM

## 2022-07-27 NOTE — TELEPHONE ENCOUNTER
Patient called [facility] radiology due to [facility] being out of network with her insurance company.

## 2022-09-28 ENCOUNTER — OFFICE VISIT (OUTPATIENT)
Dept: ORTHOPEDIC SURGERY | Age: 47
End: 2022-09-28
Payer: COMMERCIAL

## 2022-09-28 VITALS — HEIGHT: 68 IN | BODY MASS INDEX: 21.98 KG/M2 | WEIGHT: 145 LBS

## 2022-09-28 DIAGNOSIS — M79.672 LEFT FOOT PAIN: ICD-10-CM

## 2022-09-28 DIAGNOSIS — M77.42 METATARSALGIA, LEFT FOOT: Primary | ICD-10-CM

## 2022-09-28 PROCEDURE — 99212 OFFICE O/P EST SF 10 MIN: CPT | Performed by: ORTHOPAEDIC SURGERY

## 2022-09-28 NOTE — LETTER
10/2/2022    Patient: Leisa Pink   YOB: 1975   Date of Visit: 9/28/2022     Timmy Brian MD  [address] [place] [id number]  Via Fax: [phone]    Dear Timmy Brian MD,      Thank you for referring Ms. Leisa Pink to [facility] for evaluation. My notes for this consultation are attached. If you have questions, please do not hesitate to call me. I look forward to following your patient along with you.       Sincerely,    Johanne Storey MD

## 2022-09-28 NOTE — PROGRESS NOTES
Maurice Barraza (: 1975) is a 49 y.o. female, patient,here for evaluation of the following   Chief Complaint   Patient presents with    Foot Pain     Left         ASSESSMENT/PLAN:  Below is the assessment and plan developed based on review of pertinent history, physical exam, labs, studies, and medications. 1. Metatarsalgia, left foot  2. Left foot pain  -     XR STANDING FOOT LT MIN 3 V; Future      Patient has full foot exam consistent with metatarsalgia, possibly increased pressure to the area resulting in pain. I do recommend appropriate shoe wear and insoles to start with, activities as tolerated. If symptoms do not improve despite the conservative treatment changes in shoes and use of insoles with metatarsal padding, patient can return for further evaluation which may include obtaining an MRI to further evaluate. At this point also, the symptoms are not consistent with Khan's neuroma, the pain is specifically under the second metatarsal head and the flexor tendon which is otherwise intact. I have answered all her questions regarding this condition. Return if symptoms worsen or fail to improve. No Known Allergies    Current Outpatient Medications   Medication Sig    gabapentin (NEURONTIN) 600 mg tablet Take 600 mg by mouth nightly. multivitamin (ONE A DAY) tablet Take 1 Tablet by mouth daily. Lactobac no.41/Bifidobact no.7 (PROBIOTIC-10 PO) Take  by mouth. No current facility-administered medications for this visit. No past medical history on file.     Past Surgical History:   Procedure Laterality Date    HX TUBAL LIGATION      [place] VAG HYST,RMV TUBE/OVARY         Family History   Problem Relation Age of Onset    Heart Disease Father        Social History     Socioeconomic History    Marital status:      Spouse name: Not on file    Number of children: Not on file    Years of education: Not on file    Highest education level: Not on file   Occupational History Not on file   Tobacco Use    Smoking status: Never    Smokeless tobacco: Never   Substance and Sexual Activity    Alcohol use: Yes    Drug use: Not on file    Sexual activity: Not on file   Other Topics Concern    Not on file   Social History Narrative    Not on file     Social Determinants of Health     Financial Resource Strain: Not on file   Food Insecurity: Not on file   Transportation Needs: Not on file   Physical Activity: Not on file   Stress: Not on file   Social Connections: Not on file   Intimate Partner Violence: Not on file   Housing Stability: Not on file           Vitals:  Ht 5' 8\" (1.727 m)   Wt 145 lb (65.8 kg)   BMI 22.05 kg/m²    Body mass index is 22.05 kg/m². SUBJECTIVE:  Nida Brayan (: 1975)   New patient presents today with complaint of forefoot pain under the second and third toes, ongoing for the past 3 weeks, pain described as moderate dull constant pain and pain worse with standing, running, walking, stairs/steps. There have been no injuries. Patient has tried ice only, symptoms unchanged. She is not diabetic, non-smoker. OBJECTIVE EXAM:     Visit Vitals  Ht 5' 8\" (1.727 m)   Wt 145 lb (65.8 kg)   BMI 22.05 kg/m²       Appearance: Alert, well appearing and pleasant patient who is in no distress, oriented to person, place/time, and who follows commands. This patient is accompanied in the       office by her  self. Psychiatric: Affect and mood are appropriate. No dementia noted on examination  Musculoskeletal:  LOCATION: Diffuse tenderness under the second metatarsal head foot - left      Integumentary: No rashes, skin patches, wounds, or abrasions to the right or left legs       Warm and normal color. No regions of expressible drainage.       Gait: Normal      Tenderness: No tenderness        Motor/Strength/Tone Exam: Normal       Sensory Exam:   Intact Normal Sensation to ankle/foot      Stability Testing: No anterolateral or varus instability of the Ankle or Subtalar Joints               No peroneal tendon instability noted      ROM: Normal ROM noted to ankle/foot      Contractures: No Achilles or Gastrocnemius Contractures      Calf tenderness: Absent for calf or gastrocnemius muscle regions       Soft, supple, non tender, non taut lower extremity compartment  Alignment:      NEUTRAL Hindfoot,         none Metatarsus Adductus Metatarsus   Wounds/Abrasions:    None present  Extremities:   No embolic phenomena to the toes          No significant edema to the foot and or toes. Lower extremities are warm and appear well perfused    DVT: No evidence of DVT seen on examination at this time     No calf swelling, no tenderness to calf muscles  Lymphatic:  No Evidence of Lymphedema  Vascular: Medial Border of Tibia Region: Edema is not present         Pulses: Dorsalis Pedis &  Posterior Tibial Pulses : Palpable yes        Varicosities Lower Limbs :  None  noted  Neuro: Negative bilateral Straight leg raise (seated position)    See Musculoskeletal section for pertinent individual extremity examination    No abnormal hand/wrist, foot/ankle, or facial/neck tremors. Lower Extremity/Ankle/Foot:  Mild antalgic gait, normal weightbearing stance. Left lower extremity/ankle: Full active and passive range of motion intact, mostly nontender, no swelling, ligament stable. Negative Waggoner test, negative ankle squeeze test.    Left foot: No severe malalignment or deformity. Just tenderness at the metatarsal head of second metatarsal on the plantar aspect and the flexor tendon. The tendon is intact as she is able to actively and passively flex and extend toes, rest of foot is nontender. The webspace at second and third webspace nontender, negative Theresa's test.  Brisk capillary toe refill, light touch sensation intact. No swelling. Contralateral lower extremity/ankle /foot exam:  Nontender, no swelling ligaments grossly stable.   Normal weightbearing stance. Neurovascular exam intact light touch sensation, cap refill, flexion/extension toe strength 5/5. Dorsalis pedis pulse palpable. Imaging:    XR Results (most recent):  Results from Appointment encounter on 09/28/22    XR STANDING FOOT LT MIN 3 V    Narrative  Left foot standing AP, lateral and oblique x-rays show mild hallux valgus, no acute fractures or dislocations seen, no significant arthritis at the midfoot, no soft tissue swelling. Normal bone density. No acute abnormalities. An electronic signature was used to authenticate this note.   -- Cassia Adam MD

## 2022-10-31 DIAGNOSIS — M77.42 METATARSALGIA, LEFT FOOT: ICD-10-CM

## 2022-10-31 DIAGNOSIS — M79.672 LEFT FOOT PAIN: Primary | ICD-10-CM

## 2022-10-31 RX ORDER — METHYLPREDNISOLONE 4 MG/1
TABLET ORAL
Qty: 1 DOSE PACK | Refills: 0 | Status: SHIPPED | OUTPATIENT
Start: 2022-10-31 | End: 2022-11-12